# Patient Record
Sex: MALE | Race: WHITE | NOT HISPANIC OR LATINO | URBAN - METROPOLITAN AREA
[De-identification: names, ages, dates, MRNs, and addresses within clinical notes are randomized per-mention and may not be internally consistent; named-entity substitution may affect disease eponyms.]

---

## 2017-10-14 ENCOUNTER — EMERGENCY (EMERGENCY)
Facility: HOSPITAL | Age: 82
LOS: 1 days | Discharge: LEFT BEFORE DISCHARGE | End: 2017-10-14
Attending: EMERGENCY MEDICINE | Admitting: EMERGENCY MEDICINE
Payer: MEDICARE

## 2017-10-14 VITALS
RESPIRATION RATE: 16 BRPM | OXYGEN SATURATION: 100 % | SYSTOLIC BLOOD PRESSURE: 123 MMHG | TEMPERATURE: 98 F | DIASTOLIC BLOOD PRESSURE: 74 MMHG | HEART RATE: 60 BPM

## 2017-10-14 VITALS
SYSTOLIC BLOOD PRESSURE: 117 MMHG | RESPIRATION RATE: 18 BRPM | OXYGEN SATURATION: 100 % | TEMPERATURE: 98 F | DIASTOLIC BLOOD PRESSURE: 77 MMHG | HEART RATE: 52 BPM | WEIGHT: 184.97 LBS | HEIGHT: 72 IN

## 2017-10-14 DIAGNOSIS — Z98.890 OTHER SPECIFIED POSTPROCEDURAL STATES: Chronic | ICD-10-CM

## 2017-10-14 LAB
ALBUMIN SERPL ELPH-MCNC: 3.2 G/DL — LOW (ref 3.3–5)
ALP SERPL-CCNC: 60 U/L — SIGNIFICANT CHANGE UP (ref 40–120)
ALT FLD-CCNC: 24 U/L — SIGNIFICANT CHANGE UP (ref 12–78)
ANION GAP SERPL CALC-SCNC: 8 MMOL/L — SIGNIFICANT CHANGE UP (ref 5–17)
AST SERPL-CCNC: 22 U/L — SIGNIFICANT CHANGE UP (ref 15–37)
BASOPHILS # BLD AUTO: 0 K/UL — SIGNIFICANT CHANGE UP (ref 0–0.2)
BASOPHILS NFR BLD AUTO: 0.7 % — SIGNIFICANT CHANGE UP (ref 0–2)
BILIRUB SERPL-MCNC: 0.6 MG/DL — SIGNIFICANT CHANGE UP (ref 0.2–1.2)
BUN SERPL-MCNC: 30 MG/DL — HIGH (ref 7–23)
CALCIUM SERPL-MCNC: 8.7 MG/DL — SIGNIFICANT CHANGE UP (ref 8.5–10.1)
CHLORIDE SERPL-SCNC: 109 MMOL/L — HIGH (ref 96–108)
CO2 SERPL-SCNC: 23 MMOL/L — SIGNIFICANT CHANGE UP (ref 22–31)
CREAT SERPL-MCNC: 1.5 MG/DL — HIGH (ref 0.5–1.3)
EOSINOPHIL # BLD AUTO: 0.1 K/UL — SIGNIFICANT CHANGE UP (ref 0–0.5)
EOSINOPHIL NFR BLD AUTO: 2.4 % — SIGNIFICANT CHANGE UP (ref 0–6)
ETHANOL SERPL-MCNC: <10 MG/DL — SIGNIFICANT CHANGE UP (ref 0–10)
GLUCOSE SERPL-MCNC: 141 MG/DL — HIGH (ref 70–99)
HCT VFR BLD CALC: 43.9 % — SIGNIFICANT CHANGE UP (ref 39–50)
HGB BLD-MCNC: 13.8 G/DL — SIGNIFICANT CHANGE UP (ref 13–17)
LYMPHOCYTES # BLD AUTO: 1.4 K/UL — SIGNIFICANT CHANGE UP (ref 1–3.3)
LYMPHOCYTES # BLD AUTO: 25.1 % — SIGNIFICANT CHANGE UP (ref 13–44)
MCHC RBC-ENTMCNC: 30.1 PG — SIGNIFICANT CHANGE UP (ref 27–34)
MCHC RBC-ENTMCNC: 31.4 GM/DL — LOW (ref 32–36)
MCV RBC AUTO: 95.7 FL — SIGNIFICANT CHANGE UP (ref 80–100)
MONOCYTES # BLD AUTO: 0.5 K/UL — SIGNIFICANT CHANGE UP (ref 0–0.9)
MONOCYTES NFR BLD AUTO: 9.2 % — HIGH (ref 1–9)
NEUTROPHILS # BLD AUTO: 3.6 K/UL — SIGNIFICANT CHANGE UP (ref 1.8–7.4)
NEUTROPHILS NFR BLD AUTO: 62.7 % — SIGNIFICANT CHANGE UP (ref 43–77)
PLATELET # BLD AUTO: 194 K/UL — SIGNIFICANT CHANGE UP (ref 150–400)
POTASSIUM SERPL-MCNC: 4.8 MMOL/L — SIGNIFICANT CHANGE UP (ref 3.5–5.3)
POTASSIUM SERPL-SCNC: 4.8 MMOL/L — SIGNIFICANT CHANGE UP (ref 3.5–5.3)
PROT SERPL-MCNC: 7.3 G/DL — SIGNIFICANT CHANGE UP (ref 6–8.3)
RBC # BLD: 4.59 M/UL — SIGNIFICANT CHANGE UP (ref 4.2–5.8)
RBC # FLD: 13.4 % — SIGNIFICANT CHANGE UP (ref 10.3–14.5)
SODIUM SERPL-SCNC: 140 MMOL/L — SIGNIFICANT CHANGE UP (ref 135–145)
WBC # BLD: 5.7 K/UL — SIGNIFICANT CHANGE UP (ref 3.8–10.5)
WBC # FLD AUTO: 5.7 K/UL — SIGNIFICANT CHANGE UP (ref 3.8–10.5)

## 2017-10-14 PROCEDURE — 99284 EMERGENCY DEPT VISIT MOD MDM: CPT | Mod: 25

## 2017-10-14 PROCEDURE — 82550 ASSAY OF CK (CPK): CPT

## 2017-10-14 PROCEDURE — 84484 ASSAY OF TROPONIN QUANT: CPT

## 2017-10-14 PROCEDURE — 80307 DRUG TEST PRSMV CHEM ANLYZR: CPT

## 2017-10-14 PROCEDURE — 85027 COMPLETE CBC AUTOMATED: CPT

## 2017-10-14 PROCEDURE — 71045 X-RAY EXAM CHEST 1 VIEW: CPT

## 2017-10-14 PROCEDURE — 71010: CPT | Mod: 26

## 2017-10-14 PROCEDURE — 93005 ELECTROCARDIOGRAM TRACING: CPT

## 2017-10-14 PROCEDURE — 82553 CREATINE MB FRACTION: CPT

## 2017-10-14 PROCEDURE — 80053 COMPREHEN METABOLIC PANEL: CPT

## 2017-10-14 PROCEDURE — 99285 EMERGENCY DEPT VISIT HI MDM: CPT

## 2017-10-14 RX ORDER — FUROSEMIDE 40 MG
0 TABLET ORAL
Qty: 0 | Refills: 0 | COMMUNITY

## 2017-10-14 RX ORDER — AMIODARONE HYDROCHLORIDE 400 MG/1
0 TABLET ORAL
Qty: 0 | Refills: 0 | COMMUNITY

## 2017-10-14 RX ORDER — SODIUM CHLORIDE 9 MG/ML
3 INJECTION INTRAMUSCULAR; INTRAVENOUS; SUBCUTANEOUS ONCE
Qty: 0 | Refills: 0 | Status: DISCONTINUED | OUTPATIENT
Start: 2017-10-14 | End: 2017-10-18

## 2017-10-14 RX ORDER — SPIRONOLACTONE 25 MG/1
0 TABLET, FILM COATED ORAL
Qty: 0 | Refills: 0 | COMMUNITY

## 2017-10-14 RX ORDER — DABIGATRAN ETEXILATE MESYLATE 150 MG/1
0 CAPSULE ORAL
Qty: 0 | Refills: 0 | COMMUNITY

## 2017-10-14 RX ORDER — ATORVASTATIN CALCIUM 80 MG/1
0 TABLET, FILM COATED ORAL
Qty: 0 | Refills: 0 | COMMUNITY

## 2017-10-14 NOTE — CONSULT NOTE ADULT - SUBJECTIVE AND OBJECTIVE BOX
CHIEF COMPLAINT: Patient is a 82y old  Male who presents with a chief complaint of syncope    HPI: 82 [place] M w hx of HTN, HLD, DM, PAF recent DCCV in [place] and put on Amio, TBS no PPM, reported recent normal stress testing, ?CHF presents for syncope. Was at wedding. Standing in heat for long time. Was thirsty and accidentally drank ETOH too quickly. Then started to get lightheaded. Notice by wife to become pale and had brief LOC. Then had another episode minutes later for seconds.   Apparently has chronic bradycardia. Was discussing PPM with cards in [place]. Today took his Aldactone.   Currently denies any chest pain, dyspnea, PND, orthopnea,  lower extremity edema, stroke like symptoms.       EKG: SB w 1 degree bifasicular block    REVIEW OF SYSTEMS:   All other review of systems are negative unless indicated above    PAST MEDICAL & SURGICAL HISTORY:  Hypertension  Hyperlipidemia  Diabetes mellitus  Afib: cardioversion in September 2017  History of laminectomy      SOCIAL HISTORY:  No tobacco, ethanol, or drug abuse.    FAMILY HISTORY:    No family history of acute MI or sudden cardiac death.    Home Medications:  amiodarone:  (14 Oct 2017 16:07)  atorvastatin:  (14 Oct 2017 16:07)  furosemide:  (14 Oct 2017 16:07)  glipiZIDE:  (14 Oct 2017 16:07)  Pradaxa:  (14 Oct 2017 16:07)  spironolactone:  (14 Oct 2017 16:07)        Allergies    No Known Allergies    Intolerances            VITAL SIGNS:   Vital Signs Last 24 Hrs  T(C): 36.4 (14 Oct 2017 15:46), Max: 36.4 (14 Oct 2017 15:46)  T(F): 97.6 (14 Oct 2017 15:46), Max: 97.6 (14 Oct 2017 15:46)  HR: 52 (14 Oct 2017 15:46) (52 - 52)  BP: 117/77 (14 Oct 2017 15:46) (117/77 - 117/77)  BP(mean): --  RR: 18 (14 Oct 2017 15:46) (18 - 18)  SpO2: 100% (14 Oct 2017 15:46) (100% - 100%)    I&O's Summary      On Exam:     Constitutional: NAD, awake and alert, well-developed  HEENT: Dry Mucous Membranes, Anicteric  Pulmonary: Non-labored, breath sounds are clear bilaterally, No wheezing, rales or rhonchi  Cardiovascular: Regular, S1 and S2, No murmurs, rubs, gallops or clicks  Gastrointestinal: Bowel Sounds present, soft, nontender.   Lymph: No peripheral edema. No lymphadenopathy.  Skin: No visible rashes or ulcers.  Psych:  Mood & affect appropriate    LABS: All Labs Reviewed:                        13.8   5.7   )-----------( 194      ( 14 Oct 2017 16:25 )             43.9     14 Oct 2017 16:25    140    |  109    |  30     ----------------------------<  141    4.8     |  23     |  1.50     Ca    8.7        14 Oct 2017 16:25    TPro  7.3    /  Alb  3.2    /  TBili  0.6    /  DBili  x      /  AST  22     /  ALT  24     /  AlkPhos  60     14 Oct 2017 16:25      CARDIAC MARKERS ( 14 Oct 2017 16:25 )  <.015 ng/mL / x     / x     / x     / x          Blood Culture:         RADIOLOGY:    < from: Xray Chest 1 View AP/PA (10.14.17 @ 16:05) >    EXAM:  CHEST 1 VIEW                            PROCEDURE DATE:  10/14/2017          INTERPRETATION:  History: Syncope    Portable AP radiograph of the chest is performed. There are no prior   studies for comparison.    The cardiomediastinal silhouette is normal. The trachea is midline. There   is no focal infiltrate or pleural effusion. There is no pneumothorax.   Osseous structures are unremarkable.    Impression: No active pulmonary disease.                DARCI GARZA   This document has been electronically signed. Oct 14 2017  4:11PM                < end of copied text >

## 2017-10-14 NOTE — ED ADULT TRIAGE NOTE - CHIEF COMPLAINT QUOTE
"I was walking outside and passed out." "I was walking outside and passed out." patient did not hit head, family member held patient.

## 2017-10-14 NOTE — CONSULT NOTE ADULT - ASSESSMENT
82 M w hx as listed with syncope.   - Pt appears dry which make sense in the setting of being in sun and taking Aldactone today. Then likley with increased vagal tone causing worsening bradycardia and syncope.   - I have advised them to stop the Coreg for now and cont the Amio.   - Had long conversation with family in regards to medical regiment and underlying conduction disease. Pt does not want to stay for a workup. They want to drive back to [place] and see there cardiologist. I explained the risk of this. They understand and still want to go.   They are planning to sign out against medical advise.

## 2017-10-14 NOTE — ED PROVIDER NOTE - CONTEXT
pt was at granddaughter's wedding, standing,  went down, grabbed by  other guests before landing on the floor.

## 2017-10-18 DIAGNOSIS — E78.5 HYPERLIPIDEMIA, UNSPECIFIED: ICD-10-CM

## 2017-10-18 DIAGNOSIS — Z79.01 LONG TERM (CURRENT) USE OF ANTICOAGULANTS: ICD-10-CM

## 2017-10-18 DIAGNOSIS — Z79.84 LONG TERM (CURRENT) USE OF ORAL HYPOGLYCEMIC DRUGS: ICD-10-CM

## 2017-10-18 DIAGNOSIS — R55 SYNCOPE AND COLLAPSE: ICD-10-CM

## 2017-10-18 DIAGNOSIS — E11.9 TYPE 2 DIABETES MELLITUS WITHOUT COMPLICATIONS: ICD-10-CM

## 2017-10-18 DIAGNOSIS — I48.91 UNSPECIFIED ATRIAL FIBRILLATION: ICD-10-CM

## 2017-10-18 DIAGNOSIS — I10 ESSENTIAL (PRIMARY) HYPERTENSION: ICD-10-CM

## 2022-05-04 NOTE — ED PROVIDER NOTE - NS ED MD EM SELECTION
[Structural Heart and Valve Disease] : structural heart and valve disease [Hyperlipidemia] : hyperlipidemia [Hypertension] : hypertension [Formal Caregiver] : formal caregiver [id number] Comprehensive

## 2024-01-23 NOTE — ED ADULT NURSE NOTE - NS ED NURSE IV DC DT
Long discussion with the patient concerning her diagnosis of Atypical Ductal Hyperplasia and the risk that it carries over her lifetime. Currently, the risk of developing a breast cancer is thought to be as high as 30% over 25 years. She understands the need to be followed more aggressively in our [facility] every 6 months and is comfortable with this plan.    She understands that her imaging and exams have remained stable and is comfortable being followed in a conservative fashion. She understands the importance of monthly self-breast examination and knows to report any and all changes as they occur.     14-Oct-2017 17:58